# Patient Record
Sex: FEMALE | Race: WHITE
[De-identification: names, ages, dates, MRNs, and addresses within clinical notes are randomized per-mention and may not be internally consistent; named-entity substitution may affect disease eponyms.]

---

## 2021-02-02 ENCOUNTER — HOSPITAL ENCOUNTER (INPATIENT)
Dept: HOSPITAL 54 - ER | Age: 71
LOS: 10 days | Discharge: HOME HEALTH | DRG: 177 | End: 2021-02-12
Attending: FAMILY MEDICINE | Admitting: INTERNAL MEDICINE
Payer: MEDICARE

## 2021-02-02 VITALS — BODY MASS INDEX: 29.82 KG/M2 | WEIGHT: 179 LBS | HEIGHT: 65 IN

## 2021-02-02 DIAGNOSIS — N17.0: ICD-10-CM

## 2021-02-02 DIAGNOSIS — U07.1: Primary | ICD-10-CM

## 2021-02-02 DIAGNOSIS — M17.0: ICD-10-CM

## 2021-02-02 DIAGNOSIS — J96.01: ICD-10-CM

## 2021-02-02 DIAGNOSIS — I10: ICD-10-CM

## 2021-02-02 DIAGNOSIS — E88.09: ICD-10-CM

## 2021-02-02 DIAGNOSIS — Z79.899: ICD-10-CM

## 2021-02-02 DIAGNOSIS — J12.82: ICD-10-CM

## 2021-02-02 DIAGNOSIS — E66.9: ICD-10-CM

## 2021-02-02 DIAGNOSIS — Z96.653: ICD-10-CM

## 2021-02-02 DIAGNOSIS — E43: ICD-10-CM

## 2021-02-02 DIAGNOSIS — Z98.890: ICD-10-CM

## 2021-02-02 LAB
ALBUMIN SERPL BCP-MCNC: 2.4 G/DL (ref 3.4–5)
ALP SERPL-CCNC: 24 U/L (ref 46–116)
ALT SERPL W P-5'-P-CCNC: 73 U/L (ref 12–78)
AST SERPL W P-5'-P-CCNC: 55 U/L (ref 15–37)
BASE EXCESS BLDA CALC-SCNC: -4.4 MMOL/L
BASE EXCESS BLDA CALC-SCNC: -4.5 MMOL/L
BASOPHILS # BLD AUTO: 0 /CMM (ref 0–0.2)
BASOPHILS NFR BLD AUTO: 0.1 % (ref 0–2)
BILIRUB DIRECT SERPL-MCNC: 0.1 MG/DL (ref 0–0.2)
BILIRUB SERPL-MCNC: 0.5 MG/DL (ref 0.2–1)
BUN SERPL-MCNC: 18 MG/DL (ref 7–18)
CALCIUM SERPL-MCNC: 8.4 MG/DL (ref 8.5–10.1)
CHLORIDE SERPL-SCNC: 100 MMOL/L (ref 98–107)
CK SERPL-CCNC: 75 U/L (ref 26–192)
CO2 SERPL-SCNC: 22 MMOL/L (ref 21–32)
CREAT SERPL-MCNC: 1 MG/DL (ref 0.6–1.3)
CRP SERPL-MCNC: 40.1 MG/DL (ref 0–0.9)
D DIMER PPP FEU-MCNC: 1.61 MG/L(FEU (ref 0.17–0.5)
DO-HGB MFR BLDA: 210.3 MMHG
DO-HGB MFR BLDA: 617.7 MMHG
EOSINOPHIL NFR BLD AUTO: 0 % (ref 0–6)
FERRITIN SERPL-MCNC: 820 NG/ML (ref 8–388)
GLUCOSE SERPL-MCNC: 141 MG/DL (ref 74–106)
HCT VFR BLD AUTO: 41 % (ref 33–45)
HGB BLD-MCNC: 13.8 G/DL (ref 11.5–14.8)
INHALED O2 CONCENTRATION: 100 %
INHALED O2 CONCENTRATION: 42 %
INHALED O2 FLOW RATE: 5.5 L/MIN (ref 0–30)
LYMPHOCYTES NFR BLD AUTO: 0.8 /CMM (ref 0.8–4.8)
LYMPHOCYTES NFR BLD AUTO: 6.3 % (ref 20–44)
MCHC RBC AUTO-ENTMCNC: 34 G/DL (ref 31–36)
MCV RBC AUTO: 90 FL (ref 82–100)
MONOCYTES NFR BLD AUTO: 0.7 /CMM (ref 0.1–1.3)
MONOCYTES NFR BLD AUTO: 5.3 % (ref 2–12)
NEUTROPHILS # BLD AUTO: 11.6 /CMM (ref 1.8–8.9)
NEUTROPHILS NFR BLD AUTO: 88.3 % (ref 43–81)
PCO2 TEMP ADJ BLDA: 22.3 MMHG (ref 35–45)
PCO2 TEMP ADJ BLDA: 25.4 MMHG (ref 35–45)
PH TEMP ADJ BLDA: 7.46 [PH] (ref 7.35–7.45)
PH TEMP ADJ BLDA: 7.49 [PH] (ref 7.35–7.45)
PLATELET # BLD AUTO: 283 /CMM (ref 150–450)
PO2 TEMP ADJ BLDA: 63.6 MMHG (ref 75–100)
PO2 TEMP ADJ BLDA: 69.9 MMHG (ref 75–100)
POTASSIUM SERPL-SCNC: 4 MMOL/L (ref 3.5–5.1)
PROT SERPL-MCNC: 7.1 G/DL (ref 6.4–8.2)
RBC # BLD AUTO: 4.54 MIL/UL (ref 4–5.2)
SAO2 % BLDA: 92.6 % (ref 92–98.5)
SAO2 % BLDA: 93.8 % (ref 92–98.5)
SODIUM SERPL-SCNC: 135 MMOL/L (ref 136–145)
VENTILATION MODE VENT: (no result)
WBC NRBC COR # BLD AUTO: 13.2 K/UL (ref 4.3–11)

## 2021-02-02 PROCEDURE — U0003 INFECTIOUS AGENT DETECTION BY NUCLEIC ACID (DNA OR RNA); SEVERE ACUTE RESPIRATORY SYNDROME CORONAVIRUS 2 (SARS-COV-2) (CORONAVIRUS DISEASE [COVID-19]), AMPLIFIED PROBE TECHNIQUE, MAKING USE OF HIGH THROUGHPUT TECHNOLOGIES AS DESCRIBED BY CMS-2020-01-R: HCPCS

## 2021-02-02 PROCEDURE — A4217 STERILE WATER/SALINE, 500 ML: HCPCS

## 2021-02-02 PROCEDURE — G0378 HOSPITAL OBSERVATION PER HR: HCPCS

## 2021-02-02 PROCEDURE — A4216 STERILE WATER/SALINE, 10 ML: HCPCS

## 2021-02-02 PROCEDURE — XW033E5 INTRODUCTION OF REMDESIVIR ANTI-INFECTIVE INTO PERIPHERAL VEIN, PERCUTANEOUS APPROACH, NEW TECHNOLOGY GROUP 5: ICD-10-PCS | Performed by: INTERNAL MEDICINE

## 2021-02-02 PROCEDURE — C9803 HOPD COVID-19 SPEC COLLECT: HCPCS

## 2021-02-02 RX ADMIN — AMLODIPINE BESYLATE SCH MG: 10 TABLET ORAL at 20:01

## 2021-02-02 RX ADMIN — LOSARTAN POTASSIUM SCH MG: 50 TABLET, FILM COATED ORAL at 20:01

## 2021-02-02 NOTE — NUR
-------------------------------------------------------------------------------

          *** Note undone in [facility] - 02/02/21 at 1928 by SATISH ***           

-------------------------------------------------------------------------------

TOOK OVER PT CARE. PT REMAINS ON HIGH FLOW 40L/MIN 31C. WILL CONTINUE TO 
MONITOR.

## 2021-02-02 NOTE — NUR
PT BIBA RA81 From Home "Cough/SOB/fever/chills/weak Dx w/covid 2wks ago 88% on 
RA". PT IS AAOX4, NOTED RESPIRATORY DISTRESS, HOOKED TO O2 VIA NC AT 6LPM, 
HOOKED TO CARDIAC MONITOR, KEPT RESTED AND COMFORTABLE. WILL CONTINUE TO 
MONITOR.

## 2021-02-03 VITALS — SYSTOLIC BLOOD PRESSURE: 125 MMHG | DIASTOLIC BLOOD PRESSURE: 79 MMHG

## 2021-02-03 VITALS — DIASTOLIC BLOOD PRESSURE: 93 MMHG | SYSTOLIC BLOOD PRESSURE: 132 MMHG

## 2021-02-03 VITALS — DIASTOLIC BLOOD PRESSURE: 75 MMHG | SYSTOLIC BLOOD PRESSURE: 129 MMHG

## 2021-02-03 LAB
ALBUMIN SERPL BCP-MCNC: 2.2 G/DL (ref 3.4–5)
ALP SERPL-CCNC: 24 U/L (ref 46–116)
ALT SERPL W P-5'-P-CCNC: 92 U/L (ref 12–78)
AST SERPL W P-5'-P-CCNC: 53 U/L (ref 15–37)
BASOPHILS # BLD AUTO: 0 /CMM (ref 0–0.2)
BASOPHILS NFR BLD AUTO: 0.1 % (ref 0–2)
BILIRUB DIRECT SERPL-MCNC: 0.2 MG/DL (ref 0–0.2)
BILIRUB SERPL-MCNC: 0.4 MG/DL (ref 0.2–1)
BUN SERPL-MCNC: 18 MG/DL (ref 7–18)
CALCIUM SERPL-MCNC: 8.5 MG/DL (ref 8.5–10.1)
CHLORIDE SERPL-SCNC: 102 MMOL/L (ref 98–107)
CHOLEST SERPL-MCNC: 179 MG/DL (ref ?–200)
CK SERPL-CCNC: 62 U/L (ref 26–192)
CO2 SERPL-SCNC: 23 MMOL/L (ref 21–32)
CREAT SERPL-MCNC: 0.9 MG/DL (ref 0.6–1.3)
CRP SERPL-MCNC: 26.5 MG/DL (ref 0–0.9)
D DIMER PPP FEU-MCNC: 1.3 MG/L(FEU (ref 0.17–0.5)
EOSINOPHIL NFR BLD AUTO: 0 % (ref 0–6)
FERRITIN SERPL-MCNC: 824 NG/ML (ref 8–388)
GLUCOSE SERPL-MCNC: 146 MG/DL (ref 74–106)
HCT VFR BLD AUTO: 39 % (ref 33–45)
HDLC SERPL-MCNC: 14 MG/DL (ref 40–60)
HGB BLD-MCNC: 13.2 G/DL (ref 11.5–14.8)
LDLC SERPL DIRECT ASSAY-MCNC: 132 MG/DL (ref 0–99)
LYMPHOCYTES NFR BLD AUTO: 0.7 /CMM (ref 0.8–4.8)
LYMPHOCYTES NFR BLD AUTO: 8.4 % (ref 20–44)
MAGNESIUM SERPL-MCNC: 2.3 MG/DL (ref 1.8–2.4)
MCHC RBC AUTO-ENTMCNC: 34 G/DL (ref 31–36)
MCV RBC AUTO: 90 FL (ref 82–100)
MONOCYTES NFR BLD AUTO: 0.5 /CMM (ref 0.1–1.3)
MONOCYTES NFR BLD AUTO: 5.7 % (ref 2–12)
NEUTROPHILS # BLD AUTO: 7.7 /CMM (ref 1.8–8.9)
NEUTROPHILS NFR BLD AUTO: 85.8 % (ref 43–81)
NT-PROBNP SERPL-MCNC: 300 PG/ML (ref 0–125)
PHOSPHATE SERPL-MCNC: 4 MG/DL (ref 2.5–4.9)
PLATELET # BLD AUTO: 276 /CMM (ref 150–450)
POTASSIUM SERPL-SCNC: 4.6 MMOL/L (ref 3.5–5.1)
PROT SERPL-MCNC: 6.7 G/DL (ref 6.4–8.2)
RBC # BLD AUTO: 4.29 MIL/UL (ref 4–5.2)
SODIUM SERPL-SCNC: 136 MMOL/L (ref 136–145)
TRIGL SERPL-MCNC: 165 MG/DL (ref 30–150)
TSH SERPL DL<=0.005 MIU/L-ACNC: 0.51 UIU/ML (ref 0.36–3.74)
WBC NRBC COR # BLD AUTO: 8.9 K/UL (ref 4.3–11)

## 2021-02-03 RX ADMIN — LOSARTAN POTASSIUM SCH MG: 50 TABLET, FILM COATED ORAL at 09:00

## 2021-02-03 RX ADMIN — DEXAMETHASONE SODIUM PHOSPHATE SCH MG: 10 INJECTION INTRAMUSCULAR; INTRAVENOUS at 09:18

## 2021-02-03 RX ADMIN — AMLODIPINE BESYLATE SCH MG: 10 TABLET ORAL at 09:00

## 2021-02-03 RX ADMIN — ENOXAPARIN SODIUM SCH MG: 40 INJECTION SUBCUTANEOUS at 10:39

## 2021-02-03 RX ADMIN — SODIUM CHLORIDE SCH MLS/HR: 9 INJECTION, SOLUTION INTRAVENOUS at 19:57

## 2021-02-03 RX ADMIN — ENOXAPARIN SODIUM SCH MG: 40 INJECTION SUBCUTANEOUS at 17:10

## 2021-02-03 NOTE — NUR
PT IS COUGHING A LOT REPORTED IT TO BELKYS CONNOLLY NP WITH ORDER OF ROBITUSSIN 10CC [id number] 
NOTED AND CARRIED OUT

## 2021-02-03 NOTE — NUR
RECEIVED PATIENT IN BED, A/OX4 BREATHING EVEN AND UNLABORED,ABLE TO VERBALIZED NEEDS ON NON 
- REBREATHER MASK AND HIGH FLOW NC,  60L, 100%FIO2,   SPO2 IS 93%, , NORMAL SINUS RHYTHM ON 
TELE-MONITOR WITH HR 60-80S,  RIGHT AC IV LINES PATENT ,INTACT AND FLUSHED ,  SAFETY 
MEASURES IN PLACE, CALL LIGHT IN REACH, HOB ELEVATED,  WILL CONT TO MONITOR .

## 2021-02-03 NOTE — NUR
PT COMPLAINING OF HEADACHE DUE TO HIGH OXYGEN DELIVERY FROM THE HIGH FLOW INFORMED RT AND WE 
LOWER IT DOWN TO 35L FIO2 100% VIA HIGH FLOW SPO2 NOW IS 92% SHE VERBALIZED THAT ITS MORE 
COMFORTABLE NOW WILL CONT TO MONITOR THE PT

## 2021-02-03 NOTE — NUR
RN CLOSING N0TES



PT RESTING IN BED. HF 60L, FIO2 100% SATURATING @93%. WITH NON PRODUCTIVE COUGH. SKIN IS 
INTACT. IV ACCESS INTACT, PATENT AND FLUSHED. NO PAIN REPORTED AT TIME. NEEDS ATTENDED. NO 
SIGNIFICANT CHANGES. SAFETY MEAUSRES IN PLACE, BED LOCKED AND AT LOWEST POSITION. CALL LIGHT 
WITHIN REACH. HOB ELEVATED. BED ALARM ON. WILL ENDORSE TO NIGHT NURSE FOR JOSEP.

## 2021-02-03 NOTE — NUR
RN OPENING N0TES



RECEIVED PT FROM ER VIA [facility]. AWAKE, A/O X4. HF 60L, FIO2 100% SATURATING @93%. WITH NON 
PRODUCTIVE COUGH. SKIN IS INTACT. IV ACCESS ON R AC #18 INTACT, PATENT AND FLUSHED. REFUSED 
TO CHANGE INTO HOSPITAL GOWN. BELONGINGS LIST SIGNED. NO PAIN REPORTED AT TIME. PLACED TO 
BED, LOCKED AND AT LOWEST POSITION. CALL LIGHT WITHIN REACH. HOB ELEVATED. BED ALARM ON. 
WILL CONTINUE TO MONITOR.

## 2021-02-04 VITALS — DIASTOLIC BLOOD PRESSURE: 62 MMHG | SYSTOLIC BLOOD PRESSURE: 104 MMHG

## 2021-02-04 VITALS — DIASTOLIC BLOOD PRESSURE: 75 MMHG | SYSTOLIC BLOOD PRESSURE: 128 MMHG

## 2021-02-04 VITALS — SYSTOLIC BLOOD PRESSURE: 124 MMHG | DIASTOLIC BLOOD PRESSURE: 70 MMHG

## 2021-02-04 VITALS — DIASTOLIC BLOOD PRESSURE: 77 MMHG | SYSTOLIC BLOOD PRESSURE: 131 MMHG

## 2021-02-04 VITALS — SYSTOLIC BLOOD PRESSURE: 139 MMHG | DIASTOLIC BLOOD PRESSURE: 78 MMHG

## 2021-02-04 VITALS — DIASTOLIC BLOOD PRESSURE: 72 MMHG | SYSTOLIC BLOOD PRESSURE: 121 MMHG

## 2021-02-04 LAB
ALBUMIN SERPL BCP-MCNC: 2.2 G/DL (ref 3.4–5)
ALP SERPL-CCNC: 23 U/L (ref 46–116)
ALT SERPL W P-5'-P-CCNC: 101 U/L (ref 12–78)
AST SERPL W P-5'-P-CCNC: 45 U/L (ref 15–37)
BASOPHILS # BLD AUTO: 0 /CMM (ref 0–0.2)
BASOPHILS NFR BLD AUTO: 0 % (ref 0–2)
BILIRUB DIRECT SERPL-MCNC: 0.1 MG/DL (ref 0–0.2)
BILIRUB SERPL-MCNC: 0.4 MG/DL (ref 0.2–1)
BUN SERPL-MCNC: 27 MG/DL (ref 7–18)
CALCIUM SERPL-MCNC: 8.7 MG/DL (ref 8.5–10.1)
CHLORIDE SERPL-SCNC: 103 MMOL/L (ref 98–107)
CK SERPL-CCNC: 50 U/L (ref 26–192)
CO2 SERPL-SCNC: 26 MMOL/L (ref 21–32)
CREAT SERPL-MCNC: 0.9 MG/DL (ref 0.6–1.3)
EOSINOPHIL NFR BLD AUTO: 0 % (ref 0–6)
FERRITIN SERPL-MCNC: 927 NG/ML (ref 8–388)
GLUCOSE SERPL-MCNC: 107 MG/DL (ref 74–106)
HCT VFR BLD AUTO: 39 % (ref 33–45)
HGB BLD-MCNC: 13.4 G/DL (ref 11.5–14.8)
LYMPHOCYTES NFR BLD AUTO: 0.7 /CMM (ref 0.8–4.8)
LYMPHOCYTES NFR BLD AUTO: 7.1 % (ref 20–44)
MAGNESIUM SERPL-MCNC: 2.5 MG/DL (ref 1.8–2.4)
MCHC RBC AUTO-ENTMCNC: 35 G/DL (ref 31–36)
MCV RBC AUTO: 90 FL (ref 82–100)
MONOCYTES NFR BLD AUTO: 0.9 /CMM (ref 0.1–1.3)
MONOCYTES NFR BLD AUTO: 9 % (ref 2–12)
NEUTROPHILS # BLD AUTO: 8.5 /CMM (ref 1.8–8.9)
NEUTROPHILS NFR BLD AUTO: 83.9 % (ref 43–81)
PHOSPHATE SERPL-MCNC: 4 MG/DL (ref 2.5–4.9)
PLATELET # BLD AUTO: 320 /CMM (ref 150–450)
POTASSIUM SERPL-SCNC: 4.5 MMOL/L (ref 3.5–5.1)
PROT SERPL-MCNC: 6.7 G/DL (ref 6.4–8.2)
RBC # BLD AUTO: 4.27 MIL/UL (ref 4–5.2)
SODIUM SERPL-SCNC: 138 MMOL/L (ref 136–145)
WBC NRBC COR # BLD AUTO: 10.1 K/UL (ref 4.3–11)

## 2021-02-04 RX ADMIN — ENOXAPARIN SODIUM SCH MG: 40 INJECTION SUBCUTANEOUS at 10:40

## 2021-02-04 RX ADMIN — SODIUM CHLORIDE SCH MLS/HR: 9 INJECTION, SOLUTION INTRAVENOUS at 20:32

## 2021-02-04 RX ADMIN — DEXAMETHASONE SODIUM PHOSPHATE SCH MG: 10 INJECTION INTRAMUSCULAR; INTRAVENOUS at 10:10

## 2021-02-04 RX ADMIN — LOSARTAN POTASSIUM SCH MG: 50 TABLET, FILM COATED ORAL at 09:00

## 2021-02-04 RX ADMIN — GUAIFENESIN AND CODEINE PHOSPHATE PRN ML: 100; 10 SOLUTION ORAL at 03:34

## 2021-02-04 RX ADMIN — AMLODIPINE BESYLATE SCH MG: 10 TABLET ORAL at 09:00

## 2021-02-04 RX ADMIN — GUAIFENESIN AND CODEINE PHOSPHATE PRN ML: 100; 10 SOLUTION ORAL at 16:52

## 2021-02-04 RX ADMIN — ENOXAPARIN SODIUM SCH MG: 40 INJECTION SUBCUTANEOUS at 16:46

## 2021-02-04 NOTE — NUR
TELE/RN CLOSING NOTE



Patient watching TV in bed, A&O x 4, easily arousable to tactile and verbal stimulation. All 
needs met and attended to. No s/s of pain/discomfort at this time. Breathing even and 
non-labored on HFNC 35 L FIO2 100% + 15 L NRB, saturating 91-93%. No respiratory or cardiac 
distress noted. On tele monitor, reading SR 74. IV access noted on RAC #18, patent and 
intact, and flushing well. Fall precautions maintained. Will endorse to night shift nurse.

## 2021-02-04 NOTE — NUR
RN OPENING NOTE: 



PATIENT RECEIVED IN ROOM IN NO SIGNS OF RESPIRATORY DISTRESS. PATIENT IS ON O2 THERAPY VIA 
HFNC 35 % + NRB 15 LPM TOLERATING WELL. IV ACCESS MAINTAINED INTACT, SECURED AND 
FLUSHING WELL. ISOLATION PRECAUTIONS MAINTAINED. SAFETY MEASURES IMPLEMENTED, BED LOCKED IN 
LOWEST POSITION, SIDE RAILS UP, CALL LIGHT WITHIN REACH. WILL CONTINUE TO MONITOR PATIENT 
AND PROVIDE CARE THROUGHOUT SHIFT.

## 2021-02-04 NOTE — NUR
PT ON BED ASLEEP EASY TO WAKE UP A/O X3 STILL ON HIGH FLOW 35L 100% FIO2 AND NON REBREATHER 
MASK 15L WITH SPO2 97% NO DISTRESS NOTED NO PAIN COMPLAINED NO SIGNIFICANT CHANGES ON 
CONDITION NOTED ALL NEEDS ATTENDED BED ON LOWEST POSITION AND LOCKED SIDE RAILS UP X2 CALL 
LIGHT WITHIN REACH WILL ENDORSED TO AM SHIFT NURSE

## 2021-02-04 NOTE — NUR
TELE/RN NOTE



Received patient resting in bed, A&O x 4, easily arousable to tactile and verbal 
stimulation. No s/s of pain/discomfort at this time. Breathing even and non-labored on RA, 
no SOB noted. No respiratory or cardiac distress noted. On tele monitor, reading SR 85. IV 
access noted on RAC #18, patent and intact, and flushing well. Bed locked to its lowest 
position, side rails x 2 up, bed alarm on. Will continue with current medical management. 

-------------------------------------------------------------------------------

Addendum: 02/04/21 at 1846 by DAREK BATES RN

-------------------------------------------------------------------------------

CORRECTION: Breathing even and non-labored on HFNC 35 L FIO2 100% + 15 L NRB, saturating 
91%.

## 2021-02-05 VITALS — SYSTOLIC BLOOD PRESSURE: 106 MMHG | DIASTOLIC BLOOD PRESSURE: 55 MMHG

## 2021-02-05 VITALS — DIASTOLIC BLOOD PRESSURE: 55 MMHG | SYSTOLIC BLOOD PRESSURE: 118 MMHG

## 2021-02-05 VITALS — SYSTOLIC BLOOD PRESSURE: 133 MMHG | DIASTOLIC BLOOD PRESSURE: 70 MMHG

## 2021-02-05 VITALS — DIASTOLIC BLOOD PRESSURE: 80 MMHG | SYSTOLIC BLOOD PRESSURE: 138 MMHG

## 2021-02-05 VITALS — DIASTOLIC BLOOD PRESSURE: 58 MMHG | SYSTOLIC BLOOD PRESSURE: 105 MMHG

## 2021-02-05 VITALS — DIASTOLIC BLOOD PRESSURE: 69 MMHG | SYSTOLIC BLOOD PRESSURE: 121 MMHG

## 2021-02-05 LAB
ALBUMIN SERPL BCP-MCNC: 2.3 G/DL (ref 3.4–5)
ALP SERPL-CCNC: 25 U/L (ref 46–116)
ALT SERPL W P-5'-P-CCNC: 74 U/L (ref 12–78)
AST SERPL W P-5'-P-CCNC: 29 U/L (ref 15–37)
BASOPHILS # BLD AUTO: 0 /CMM (ref 0–0.2)
BASOPHILS NFR BLD AUTO: 0.1 % (ref 0–2)
BILIRUB DIRECT SERPL-MCNC: 0.1 MG/DL (ref 0–0.2)
BILIRUB SERPL-MCNC: 0.4 MG/DL (ref 0.2–1)
BUN SERPL-MCNC: 29 MG/DL (ref 7–18)
CALCIUM SERPL-MCNC: 8.6 MG/DL (ref 8.5–10.1)
CHLORIDE SERPL-SCNC: 105 MMOL/L (ref 98–107)
CO2 SERPL-SCNC: 24 MMOL/L (ref 21–32)
CREAT SERPL-MCNC: 1 MG/DL (ref 0.6–1.3)
CRP SERPL-MCNC: 9.8 MG/DL (ref 0–0.9)
EOSINOPHIL NFR BLD AUTO: 0.4 % (ref 0–6)
GLUCOSE SERPL-MCNC: 80 MG/DL (ref 74–106)
HCT VFR BLD AUTO: 40 % (ref 33–45)
HGB BLD-MCNC: 13.5 G/DL (ref 11.5–14.8)
LYMPHOCYTES NFR BLD AUTO: 0.9 /CMM (ref 0.8–4.8)
LYMPHOCYTES NFR BLD AUTO: 9.8 % (ref 20–44)
MCHC RBC AUTO-ENTMCNC: 34 G/DL (ref 31–36)
MCV RBC AUTO: 91 FL (ref 82–100)
MONOCYTES NFR BLD AUTO: 0.7 /CMM (ref 0.1–1.3)
MONOCYTES NFR BLD AUTO: 8.1 % (ref 2–12)
NEUTROPHILS # BLD AUTO: 7.3 /CMM (ref 1.8–8.9)
NEUTROPHILS NFR BLD AUTO: 81.6 % (ref 43–81)
PLATELET # BLD AUTO: 333 /CMM (ref 150–450)
POTASSIUM SERPL-SCNC: 4.4 MMOL/L (ref 3.5–5.1)
PROT SERPL-MCNC: 6.8 G/DL (ref 6.4–8.2)
RBC # BLD AUTO: 4.4 MIL/UL (ref 4–5.2)
SODIUM SERPL-SCNC: 139 MMOL/L (ref 136–145)
WBC NRBC COR # BLD AUTO: 9 K/UL (ref 4.3–11)

## 2021-02-05 RX ADMIN — ENOXAPARIN SODIUM SCH MG: 40 INJECTION SUBCUTANEOUS at 16:07

## 2021-02-05 RX ADMIN — LOSARTAN POTASSIUM SCH MG: 50 TABLET, FILM COATED ORAL at 08:26

## 2021-02-05 RX ADMIN — GUAIFENESIN AND CODEINE PHOSPHATE PRN ML: 100; 10 SOLUTION ORAL at 10:19

## 2021-02-05 RX ADMIN — DEXAMETHASONE SODIUM PHOSPHATE SCH MG: 10 INJECTION INTRAMUSCULAR; INTRAVENOUS at 08:26

## 2021-02-05 RX ADMIN — SODIUM CHLORIDE SCH MLS/HR: 9 INJECTION, SOLUTION INTRAVENOUS at 20:04

## 2021-02-05 RX ADMIN — ENOXAPARIN SODIUM SCH MG: 40 INJECTION SUBCUTANEOUS at 08:34

## 2021-02-05 RX ADMIN — AMLODIPINE BESYLATE SCH MG: 10 TABLET ORAL at 08:26

## 2021-02-05 NOTE — NUR
RN TELE NOTES

PT IN BED, RESTING, EASY TO AROUSE, ALERT AND ORIENTED, NO COMPLAINT AT THIS TIME, CALL 
LIGHT WITHIN REACH.

## 2021-02-05 NOTE — NUR
RN TELE NOTES

PT IN BED, AWAKE, ALERT AND ORIENTED, NO COMPLAINT OF PAIN OR ANY DISCOMFORT, NOT IN 
DISTRESS, CALL LIGHT WITHIN REACH, NEEDS ATTENDED.

## 2021-02-05 NOTE — NUR
RN TELE NOTES

PT IN BED, RESTING, NO COMPLAINT OF PAIN OR ANY DISCOMFORT, NO SOB NOTED, ON HIGH FLOW O2 
WITH NRB MASK, WITH O2 SAT OF 95%, PM MEDS GIVEN, OFFERED PM CARE BUT PT REFUSED, STATED 
THAT SHE IS OK FOR NOW, CALL LIGHT WITHIN EASY REACH.

## 2021-02-06 VITALS — SYSTOLIC BLOOD PRESSURE: 113 MMHG | DIASTOLIC BLOOD PRESSURE: 60 MMHG

## 2021-02-06 VITALS — DIASTOLIC BLOOD PRESSURE: 68 MMHG | SYSTOLIC BLOOD PRESSURE: 122 MMHG

## 2021-02-06 VITALS — DIASTOLIC BLOOD PRESSURE: 65 MMHG | SYSTOLIC BLOOD PRESSURE: 125 MMHG

## 2021-02-06 VITALS — SYSTOLIC BLOOD PRESSURE: 103 MMHG | DIASTOLIC BLOOD PRESSURE: 54 MMHG

## 2021-02-06 VITALS — DIASTOLIC BLOOD PRESSURE: 69 MMHG | SYSTOLIC BLOOD PRESSURE: 110 MMHG

## 2021-02-06 VITALS — DIASTOLIC BLOOD PRESSURE: 46 MMHG | SYSTOLIC BLOOD PRESSURE: 95 MMHG

## 2021-02-06 LAB
ALBUMIN SERPL BCP-MCNC: 2 G/DL (ref 3.4–5)
ALP SERPL-CCNC: 21 U/L (ref 46–116)
ALT SERPL W P-5'-P-CCNC: 67 U/L (ref 12–78)
AST SERPL W P-5'-P-CCNC: 31 U/L (ref 15–37)
BASOPHILS # BLD AUTO: 0 /CMM (ref 0–0.2)
BASOPHILS NFR BLD AUTO: 0.1 % (ref 0–2)
BILIRUB DIRECT SERPL-MCNC: 0.1 MG/DL (ref 0–0.2)
BILIRUB SERPL-MCNC: 0.4 MG/DL (ref 0.2–1)
BUN SERPL-MCNC: 25 MG/DL (ref 7–18)
CALCIUM SERPL-MCNC: 8.5 MG/DL (ref 8.5–10.1)
CHLORIDE SERPL-SCNC: 107 MMOL/L (ref 98–107)
CO2 SERPL-SCNC: 25 MMOL/L (ref 21–32)
CREAT SERPL-MCNC: 1.1 MG/DL (ref 0.6–1.3)
EOSINOPHIL NFR BLD AUTO: 1.1 % (ref 0–6)
GLUCOSE SERPL-MCNC: 73 MG/DL (ref 74–106)
HCT VFR BLD AUTO: 38 % (ref 33–45)
HGB BLD-MCNC: 12.9 G/DL (ref 11.5–14.8)
LYMPHOCYTES NFR BLD AUTO: 0.7 /CMM (ref 0.8–4.8)
LYMPHOCYTES NFR BLD AUTO: 10.1 % (ref 20–44)
MCHC RBC AUTO-ENTMCNC: 34 G/DL (ref 31–36)
MCV RBC AUTO: 91 FL (ref 82–100)
MONOCYTES NFR BLD AUTO: 0.6 /CMM (ref 0.1–1.3)
MONOCYTES NFR BLD AUTO: 8.1 % (ref 2–12)
NEUTROPHILS # BLD AUTO: 5.7 /CMM (ref 1.8–8.9)
NEUTROPHILS NFR BLD AUTO: 80.6 % (ref 43–81)
PLATELET # BLD AUTO: 312 /CMM (ref 150–450)
POTASSIUM SERPL-SCNC: 4.7 MMOL/L (ref 3.5–5.1)
PROT SERPL-MCNC: 6.2 G/DL (ref 6.4–8.2)
RBC # BLD AUTO: 4.17 MIL/UL (ref 4–5.2)
SODIUM SERPL-SCNC: 141 MMOL/L (ref 136–145)
WBC NRBC COR # BLD AUTO: 7 K/UL (ref 4.3–11)

## 2021-02-06 RX ADMIN — ENOXAPARIN SODIUM SCH MG: 40 INJECTION SUBCUTANEOUS at 17:34

## 2021-02-06 RX ADMIN — GUAIFENESIN AND CODEINE PHOSPHATE PRN ML: 100; 10 SOLUTION ORAL at 14:24

## 2021-02-06 RX ADMIN — SODIUM CHLORIDE SCH MLS/HR: 9 INJECTION, SOLUTION INTRAVENOUS at 20:13

## 2021-02-06 RX ADMIN — AMLODIPINE BESYLATE SCH MG: 10 TABLET ORAL at 08:01

## 2021-02-06 RX ADMIN — GUAIFENESIN AND CODEINE PHOSPHATE PRN ML: 100; 10 SOLUTION ORAL at 20:18

## 2021-02-06 RX ADMIN — LOSARTAN POTASSIUM SCH MG: 50 TABLET, FILM COATED ORAL at 08:01

## 2021-02-06 RX ADMIN — ENOXAPARIN SODIUM SCH MG: 40 INJECTION SUBCUTANEOUS at 08:02

## 2021-02-06 RX ADMIN — DEXAMETHASONE SODIUM PHOSPHATE SCH MG: 10 INJECTION INTRAMUSCULAR; INTRAVENOUS at 08:00

## 2021-02-06 NOTE — NUR
PT REMAINS IN BED ON HF 30L/75%. PT AOX4. NO NRB MASK. PT O2 SAT 94% NO SIGNS OF RESPIRATORY 
DISTRESS. PT THIS SHIFT CONSUMED APPROX LESS THAN 50% BREAKFAST, 10% LUNCH/DINNER. MD SERVIN ORDERS ENSURE WITH MEALS PER PT REQUEST. PT HAS RAC #18 IV HL. PT HAD ONE DOSE 
ROBITUSSIN FOR COUGH AT 1430. PT HAD 2X VOIDS ON BEDPAN, NO BM THIS SHIFT. ALL SAFETY 
MEASURES IN [place]. ALL NEEDS TO BE ENDORSED TO ONCOMING RN

## 2021-02-06 NOTE — NUR
RN NOTE



RECEIVED PT IN BED A/A/O X3. PT IS ON HIGH FLOW 30L, 75% FIO2. NO SOB NOTED, PT IS ON TELE 
MONITOR SHOWING SR IN 60s. SAFETY MEASURES IN PLACE.

## 2021-02-06 NOTE — NUR
PT RECEIVED IN BED, SLEEPING BUT EASILY AROUSABLE. O2 SAT 95% NO SOB ON 35L/100% HF + 15L 
NRB. PT IS ALERT AND ORIENTEDX4. PT SKIN INTACT, BEDREST. PT RAC #18 HL. ALL SAFETY MEASURES 
IN PLACE. WILL CONTINUE TO MONITOR

## 2021-02-06 NOTE — NUR
15L NRB REMOVED BY RT. PT SAT 86-89%. RT AWARE AND ADJUST HF TO 35L/90% WITH NO MASK. O2 SAT 
NOW 94-05%.

## 2021-02-07 VITALS — DIASTOLIC BLOOD PRESSURE: 63 MMHG | SYSTOLIC BLOOD PRESSURE: 129 MMHG

## 2021-02-07 VITALS — SYSTOLIC BLOOD PRESSURE: 113 MMHG | DIASTOLIC BLOOD PRESSURE: 73 MMHG

## 2021-02-07 VITALS — SYSTOLIC BLOOD PRESSURE: 125 MMHG | DIASTOLIC BLOOD PRESSURE: 72 MMHG

## 2021-02-07 VITALS — SYSTOLIC BLOOD PRESSURE: 93 MMHG | DIASTOLIC BLOOD PRESSURE: 54 MMHG

## 2021-02-07 VITALS — DIASTOLIC BLOOD PRESSURE: 55 MMHG | SYSTOLIC BLOOD PRESSURE: 110 MMHG

## 2021-02-07 VITALS — SYSTOLIC BLOOD PRESSURE: 107 MMHG | DIASTOLIC BLOOD PRESSURE: 59 MMHG

## 2021-02-07 VITALS — DIASTOLIC BLOOD PRESSURE: 72 MMHG | SYSTOLIC BLOOD PRESSURE: 125 MMHG

## 2021-02-07 VITALS — DIASTOLIC BLOOD PRESSURE: 54 MMHG | SYSTOLIC BLOOD PRESSURE: 93 MMHG

## 2021-02-07 LAB
ALBUMIN SERPL BCP-MCNC: 1.9 G/DL (ref 3.4–5)
ALP SERPL-CCNC: 19 U/L (ref 46–116)
ALT SERPL W P-5'-P-CCNC: 53 U/L (ref 12–78)
AST SERPL W P-5'-P-CCNC: 26 U/L (ref 15–37)
BASOPHILS # BLD AUTO: 0 /CMM (ref 0–0.2)
BASOPHILS NFR BLD AUTO: 0.4 % (ref 0–2)
BILIRUB DIRECT SERPL-MCNC: 0.1 MG/DL (ref 0–0.2)
BILIRUB SERPL-MCNC: 0.4 MG/DL (ref 0.2–1)
BUN SERPL-MCNC: 26 MG/DL (ref 7–18)
CALCIUM SERPL-MCNC: 8.3 MG/DL (ref 8.5–10.1)
CHLORIDE SERPL-SCNC: 105 MMOL/L (ref 98–107)
CO2 SERPL-SCNC: 23 MMOL/L (ref 21–32)
CREAT SERPL-MCNC: 0.9 MG/DL (ref 0.6–1.3)
EOSINOPHIL NFR BLD AUTO: 0.8 % (ref 0–6)
GLUCOSE SERPL-MCNC: 92 MG/DL (ref 74–106)
HCT VFR BLD AUTO: 36 % (ref 33–45)
HGB BLD-MCNC: 12.2 G/DL (ref 11.5–14.8)
LYMPHOCYTES NFR BLD AUTO: 0.9 /CMM (ref 0.8–4.8)
LYMPHOCYTES NFR BLD AUTO: 10.3 % (ref 20–44)
MCHC RBC AUTO-ENTMCNC: 34 G/DL (ref 31–36)
MCV RBC AUTO: 90 FL (ref 82–100)
MONOCYTES NFR BLD AUTO: 0.5 /CMM (ref 0.1–1.3)
MONOCYTES NFR BLD AUTO: 6.4 % (ref 2–12)
NEUTROPHILS # BLD AUTO: 6.8 /CMM (ref 1.8–8.9)
NEUTROPHILS NFR BLD AUTO: 82.1 % (ref 43–81)
PLATELET # BLD AUTO: 302 /CMM (ref 150–450)
POTASSIUM SERPL-SCNC: 4.7 MMOL/L (ref 3.5–5.1)
PROT SERPL-MCNC: 6 G/DL (ref 6.4–8.2)
RBC # BLD AUTO: 3.96 MIL/UL (ref 4–5.2)
SODIUM SERPL-SCNC: 137 MMOL/L (ref 136–145)
WBC NRBC COR # BLD AUTO: 8.3 K/UL (ref 4.3–11)

## 2021-02-07 RX ADMIN — Medication SCH ML: at 12:10

## 2021-02-07 RX ADMIN — Medication SCH ML: at 17:18

## 2021-02-07 RX ADMIN — AMLODIPINE BESYLATE SCH MG: 10 TABLET ORAL at 08:39

## 2021-02-07 RX ADMIN — DEXAMETHASONE SODIUM PHOSPHATE SCH MG: 10 INJECTION INTRAMUSCULAR; INTRAVENOUS at 08:29

## 2021-02-07 RX ADMIN — LOSARTAN POTASSIUM SCH MG: 50 TABLET, FILM COATED ORAL at 08:38

## 2021-02-07 RX ADMIN — ENOXAPARIN SODIUM SCH MG: 40 INJECTION SUBCUTANEOUS at 16:14

## 2021-02-07 RX ADMIN — ENOXAPARIN SODIUM SCH MG: 40 INJECTION SUBCUTANEOUS at 08:31

## 2021-02-07 RX ADMIN — Medication SCH ML: at 09:39

## 2021-02-07 NOTE — NUR
TELE RN NOTES



PATIENT RECEIVED FROM RN, PATIENT IN BED AT THIS TIME, ALERT AND ORIENTED X 4, [place] 
SPEAKING. ON HIGH FLOW NASAL CANNULA. ON CARDIAC MONITOR. IV ACCESS INTACT AND PATENT, 
SALINE FLUSH. PATIENT DENIES ANY PAIN OR DISCOMFORT AT THIS TIME. SAFETY PRECAUTIONS 
IMPLEMENTED WITH BED LOCKED, BILATERAL SIDE RAILS UP, BED ALARM ON, BED IN THE LOWEST 
POSITION AND CALL LIGHT WITHIN EASY REACH. WILL CONTINUE TO MONITOR.

## 2021-02-07 NOTE — NUR
TELE RN NOTES



INFORMED DR. WHITE, HOSPITALIST ABOUT PATIENTS BLOOD PRESSURE BEING ON THE LOWER SIDE AND 
INFORMED HELD BLOOD PRESSURE MEDS WHICH WERE SCHEDULE THIS MORNING. NO NEW ORDERS MADE AT 
THIS TIME. WILL CONTINUE TO MONITOR PATIENT.

## 2021-02-07 NOTE — NUR
TELE RN NOTES



PATIENT IN BED AT THIS TIME RESTING COMFORTABLY, ALERT AND ORIENTED X 4, Estonian SPEAKING. 
ON HIGH FLOW NASAL CANNULA 30 LITERS. ON CARDIAC MONITOR. IV ACCESS INTACT AND PATENT, 
SALINE FLUSH. SKIN KEPT CLEAN AND DRY. MET ALL OF PATIENT'S NEEDS. PATIENT DENIES ANY PAIN 
OR DISCOMFORT AT THIS TIME. SAFETY PRECAUTIONS IMPLEMENTED WITH BED LOCKED, BILATERAL SIDE 
RAILS UP, BED ALARM ON, BED IN THE LOWEST POSITION AND CALL LIGHT WITHIN EASY REACH. WILL 
ENDORSE PLAN OF CARE TO UPCOMING RN.

## 2021-02-07 NOTE — NUR
RN NOTE



RECEIVED PT IN BED A/A/O X3. ON HIGH FLOW 30 L WITH FIO2 65%,SATING 95%. PT ON TELE MONITOR 
SHOWING SR WITH HR IN 50s. SAFETY MEASURES IN PLACE.

## 2021-02-08 VITALS — DIASTOLIC BLOOD PRESSURE: 61 MMHG | SYSTOLIC BLOOD PRESSURE: 113 MMHG

## 2021-02-08 VITALS — DIASTOLIC BLOOD PRESSURE: 51 MMHG | SYSTOLIC BLOOD PRESSURE: 99 MMHG

## 2021-02-08 VITALS — SYSTOLIC BLOOD PRESSURE: 138 MMHG | DIASTOLIC BLOOD PRESSURE: 79 MMHG

## 2021-02-08 VITALS — DIASTOLIC BLOOD PRESSURE: 56 MMHG | SYSTOLIC BLOOD PRESSURE: 103 MMHG

## 2021-02-08 VITALS — SYSTOLIC BLOOD PRESSURE: 124 MMHG | DIASTOLIC BLOOD PRESSURE: 76 MMHG

## 2021-02-08 VITALS — DIASTOLIC BLOOD PRESSURE: 79 MMHG | SYSTOLIC BLOOD PRESSURE: 138 MMHG

## 2021-02-08 VITALS — DIASTOLIC BLOOD PRESSURE: 64 MMHG | SYSTOLIC BLOOD PRESSURE: 105 MMHG

## 2021-02-08 LAB
BASOPHILS # BLD AUTO: 0 /CMM (ref 0–0.2)
BASOPHILS NFR BLD AUTO: 0.3 % (ref 0–2)
BUN SERPL-MCNC: 26 MG/DL (ref 7–18)
CALCIUM SERPL-MCNC: 8.2 MG/DL (ref 8.5–10.1)
CHLORIDE SERPL-SCNC: 104 MMOL/L (ref 98–107)
CK SERPL-CCNC: 28 U/L (ref 26–192)
CO2 SERPL-SCNC: 26 MMOL/L (ref 21–32)
CREAT SERPL-MCNC: 0.9 MG/DL (ref 0.6–1.3)
CRP SERPL-MCNC: 8.3 MG/DL (ref 0–0.9)
EOSINOPHIL NFR BLD AUTO: 0.8 % (ref 0–6)
FERRITIN SERPL-MCNC: 506 NG/ML (ref 8–388)
GLUCOSE SERPL-MCNC: 84 MG/DL (ref 74–106)
HCT VFR BLD AUTO: 38 % (ref 33–45)
HGB BLD-MCNC: 12.9 G/DL (ref 11.5–14.8)
LYMPHOCYTES NFR BLD AUTO: 0.9 /CMM (ref 0.8–4.8)
LYMPHOCYTES NFR BLD AUTO: 11.6 % (ref 20–44)
MAGNESIUM SERPL-MCNC: 2.2 MG/DL (ref 1.8–2.4)
MCHC RBC AUTO-ENTMCNC: 34 G/DL (ref 31–36)
MCV RBC AUTO: 91 FL (ref 82–100)
MONOCYTES NFR BLD AUTO: 0.6 /CMM (ref 0.1–1.3)
MONOCYTES NFR BLD AUTO: 7.7 % (ref 2–12)
NEUTROPHILS # BLD AUTO: 5.9 /CMM (ref 1.8–8.9)
NEUTROPHILS NFR BLD AUTO: 79.6 % (ref 43–81)
PHOSPHATE SERPL-MCNC: 2.9 MG/DL (ref 2.5–4.9)
PLATELET # BLD AUTO: 330 /CMM (ref 150–450)
POTASSIUM SERPL-SCNC: 4.5 MMOL/L (ref 3.5–5.1)
RBC # BLD AUTO: 4.22 MIL/UL (ref 4–5.2)
SODIUM SERPL-SCNC: 137 MMOL/L (ref 136–145)
WBC NRBC COR # BLD AUTO: 7.4 K/UL (ref 4.3–11)

## 2021-02-08 RX ADMIN — AMLODIPINE BESYLATE SCH MG: 10 TABLET ORAL at 08:56

## 2021-02-08 RX ADMIN — ENOXAPARIN SODIUM SCH MG: 40 INJECTION SUBCUTANEOUS at 08:57

## 2021-02-08 RX ADMIN — ENOXAPARIN SODIUM SCH MG: 40 INJECTION SUBCUTANEOUS at 17:39

## 2021-02-08 RX ADMIN — Medication SCH ML: at 08:59

## 2021-02-08 RX ADMIN — Medication SCH ML: at 12:47

## 2021-02-08 RX ADMIN — DEXAMETHASONE SODIUM PHOSPHATE SCH MG: 10 INJECTION INTRAMUSCULAR; INTRAVENOUS at 08:54

## 2021-02-08 RX ADMIN — LOSARTAN POTASSIUM SCH MG: 50 TABLET, FILM COATED ORAL at 08:55

## 2021-02-08 RX ADMIN — Medication SCH ML: at 17:59

## 2021-02-08 NOTE — NUR
RN OPENING NOTE



RECEIVED PATIENT IN BED RESTING ALERT ORIENTED X4 VERBALLY RESPONSIVE ABLE TO MAKE NEEDS 
KNOWN,ON MONITOR FOR COVID POSITIVE DROPLET/CONTACT ISOLATION,ON HIGH FLOW OXYGEN 30L 
FIO2:60% O2:92-93% IV SITE IS ON RIGHT AC INTACT PATENT,CONTINENT BOWEL/BLADDER CALL LIGHT 
WITHIN REACH,SAFETY MEASURE IMPLEMENT,CONTINUE TO MONITOR.

## 2021-02-08 NOTE — NUR
MS/RN  OPENING NOTE



PATIENT A/O X4. PATIENT AWAKE IN BED. NO ACUTE DISTRESS NOTED. ALL NEEDS THROUGHOUT THE 
SHIFT. ALL SAFETY MEASURES IN PLACE WILL ENDORSE TO NIGHT SHIFT NURSE.

## 2021-02-08 NOTE — NUR
MS/RN  OPENING NOTE



RECEIVED PATIENT FROM NIGHT SHIFT NURSE

PATIENT A/O X4. PATIENT IN BED AWAKE. NO ACUTE DISTRESS NOTED. ALL SAFETY MEASURES IN PLACE 
WILL CONTINUE TO MONITOR AND ENSURE SAFETY.

## 2021-02-09 VITALS — DIASTOLIC BLOOD PRESSURE: 67 MMHG | SYSTOLIC BLOOD PRESSURE: 117 MMHG

## 2021-02-09 VITALS — SYSTOLIC BLOOD PRESSURE: 133 MMHG | DIASTOLIC BLOOD PRESSURE: 68 MMHG

## 2021-02-09 VITALS — SYSTOLIC BLOOD PRESSURE: 102 MMHG | DIASTOLIC BLOOD PRESSURE: 60 MMHG

## 2021-02-09 VITALS — SYSTOLIC BLOOD PRESSURE: 114 MMHG | DIASTOLIC BLOOD PRESSURE: 56 MMHG

## 2021-02-09 VITALS — DIASTOLIC BLOOD PRESSURE: 71 MMHG | SYSTOLIC BLOOD PRESSURE: 129 MMHG

## 2021-02-09 VITALS — SYSTOLIC BLOOD PRESSURE: 111 MMHG | DIASTOLIC BLOOD PRESSURE: 66 MMHG

## 2021-02-09 LAB
BASOPHILS # BLD AUTO: 0 /CMM (ref 0–0.2)
BASOPHILS NFR BLD AUTO: 0.1 % (ref 0–2)
BUN SERPL-MCNC: 24 MG/DL (ref 7–18)
CALCIUM SERPL-MCNC: 8.4 MG/DL (ref 8.5–10.1)
CHLORIDE SERPL-SCNC: 105 MMOL/L (ref 98–107)
CK SERPL-CCNC: 34 U/L (ref 26–192)
CO2 SERPL-SCNC: 26 MMOL/L (ref 21–32)
CREAT SERPL-MCNC: 1 MG/DL (ref 0.6–1.3)
EOSINOPHIL NFR BLD AUTO: 0.7 % (ref 0–6)
FERRITIN SERPL-MCNC: 427 NG/ML (ref 8–388)
GLUCOSE SERPL-MCNC: 95 MG/DL (ref 74–106)
HCT VFR BLD AUTO: 37 % (ref 33–45)
HGB BLD-MCNC: 12.5 G/DL (ref 11.5–14.8)
LYMPHOCYTES NFR BLD AUTO: 1 /CMM (ref 0.8–4.8)
LYMPHOCYTES NFR BLD AUTO: 14.2 % (ref 20–44)
MAGNESIUM SERPL-MCNC: 2.2 MG/DL (ref 1.8–2.4)
MCHC RBC AUTO-ENTMCNC: 34 G/DL (ref 31–36)
MCV RBC AUTO: 90 FL (ref 82–100)
MONOCYTES NFR BLD AUTO: 0.6 /CMM (ref 0.1–1.3)
MONOCYTES NFR BLD AUTO: 9.2 % (ref 2–12)
NEUTROPHILS # BLD AUTO: 5.2 /CMM (ref 1.8–8.9)
NEUTROPHILS NFR BLD AUTO: 75.8 % (ref 43–81)
PHOSPHATE SERPL-MCNC: 3.5 MG/DL (ref 2.5–4.9)
PLATELET # BLD AUTO: 329 /CMM (ref 150–450)
POTASSIUM SERPL-SCNC: 5 MMOL/L (ref 3.5–5.1)
RBC # BLD AUTO: 4.08 MIL/UL (ref 4–5.2)
SODIUM SERPL-SCNC: 138 MMOL/L (ref 136–145)
WBC NRBC COR # BLD AUTO: 6.9 K/UL (ref 4.3–11)

## 2021-02-09 RX ADMIN — DEXAMETHASONE SODIUM PHOSPHATE SCH MG: 10 INJECTION INTRAMUSCULAR; INTRAVENOUS at 08:47

## 2021-02-09 RX ADMIN — ENOXAPARIN SODIUM SCH MG: 40 INJECTION SUBCUTANEOUS at 16:06

## 2021-02-09 RX ADMIN — Medication SCH ML: at 07:59

## 2021-02-09 RX ADMIN — Medication SCH ML: at 17:04

## 2021-02-09 RX ADMIN — Medication SCH ML: at 12:00

## 2021-02-09 RX ADMIN — AMLODIPINE BESYLATE SCH MG: 10 TABLET ORAL at 08:47

## 2021-02-09 RX ADMIN — ENOXAPARIN SODIUM SCH MG: 40 INJECTION SUBCUTANEOUS at 08:48

## 2021-02-09 RX ADMIN — LOSARTAN POTASSIUM SCH MG: 50 TABLET, FILM COATED ORAL at 08:47

## 2021-02-09 NOTE — NUR
RN CLOSING NOTE



PATIENT REMAINS ON ALERT ORIENTED X4 VERBALLY RESPONSIVE ON HIGH FLOW OXYGEN 30L FIO2:60% 
O2:97% NO SOB NOT ACUTE DISTRESS NOTED IV SITE IS ON RIGHT AC INTACT PATENT,KEPT CLEAN AND 
DRY ALL THE TIME,KEPT COMFORTABLE,KEPT CALL LIGHT WITHIN REACH,ENDORSE NEXT COMING SHIFT FOR 
CONTINUATION OF CARE.

## 2021-02-09 NOTE — NUR
RN OPENING NOTE



RECEIVED PATIENT IN BED RESTING ALERT ORIENTED X4 VERBALLY RESPONSIVE ABLE TO MAKE NEEDS 
KNOWN,ON MONITOR FOR COVID POSITIVE DROPLET/CONTACT ISOLATION,ON HIGH FLOW OXYGEN 30L 
FIO2:50% O2:94-95% IV SITE IS ON RIGHT AC INTACT PATENT,CONTINENT BOWEL/BLADDER CALL LIGHT 
WITHIN REACH,SAFETY MEASURE IMPLEMENT,BED IN LOW POSITION AND LOCKED,CONTINUE TO MONITOR.

## 2021-02-09 NOTE — NUR
MS/RN   CLOSING NOTE



PATIENT IS AWAKE IN BED A/O X4. DENIES ANY PAIN AT THIS TIME. NO ACUTE DISTRESS NOTED. HIFLO 
OXYGEN 30L FIO2 50% VIA NASAL CANNULA TOLERATING WELL, NO SOB NOTED. ALL NEEDS MET 
THROUGHOUT THE SHIFT. SAFETY MEASURES IN PLACE BED LOCKED AND IN LOWEST POSITION, CALL LIGHT 
WITHIN REACH. WILL ENDORSE TO NIGHT SHIFT NURSE.

## 2021-02-10 VITALS — SYSTOLIC BLOOD PRESSURE: 94 MMHG | DIASTOLIC BLOOD PRESSURE: 38 MMHG

## 2021-02-10 VITALS — SYSTOLIC BLOOD PRESSURE: 139 MMHG | DIASTOLIC BLOOD PRESSURE: 65 MMHG

## 2021-02-10 VITALS — DIASTOLIC BLOOD PRESSURE: 63 MMHG | SYSTOLIC BLOOD PRESSURE: 106 MMHG

## 2021-02-10 VITALS — DIASTOLIC BLOOD PRESSURE: 60 MMHG | SYSTOLIC BLOOD PRESSURE: 115 MMHG

## 2021-02-10 VITALS — SYSTOLIC BLOOD PRESSURE: 136 MMHG | DIASTOLIC BLOOD PRESSURE: 61 MMHG

## 2021-02-10 LAB
BASOPHILS # BLD AUTO: 0 /CMM (ref 0–0.2)
BASOPHILS NFR BLD AUTO: 0.3 % (ref 0–2)
BUN SERPL-MCNC: 27 MG/DL (ref 7–18)
CALCIUM SERPL-MCNC: 8.6 MG/DL (ref 8.5–10.1)
CHLORIDE SERPL-SCNC: 105 MMOL/L (ref 98–107)
CO2 SERPL-SCNC: 28 MMOL/L (ref 21–32)
CREAT SERPL-MCNC: 1 MG/DL (ref 0.6–1.3)
EOSINOPHIL NFR BLD AUTO: 0.8 % (ref 0–6)
GLUCOSE SERPL-MCNC: 87 MG/DL (ref 74–106)
HCT VFR BLD AUTO: 40 % (ref 33–45)
HGB BLD-MCNC: 13.4 G/DL (ref 11.5–14.8)
LYMPHOCYTES NFR BLD AUTO: 1.2 /CMM (ref 0.8–4.8)
LYMPHOCYTES NFR BLD AUTO: 15.2 % (ref 20–44)
MCHC RBC AUTO-ENTMCNC: 33 G/DL (ref 31–36)
MCV RBC AUTO: 91 FL (ref 82–100)
MONOCYTES NFR BLD AUTO: 0.9 /CMM (ref 0.1–1.3)
MONOCYTES NFR BLD AUTO: 11.5 % (ref 2–12)
NEUTROPHILS # BLD AUTO: 5.6 /CMM (ref 1.8–8.9)
NEUTROPHILS NFR BLD AUTO: 72.2 % (ref 43–81)
PLATELET # BLD AUTO: 339 /CMM (ref 150–450)
POTASSIUM SERPL-SCNC: 5.1 MMOL/L (ref 3.5–5.1)
RBC # BLD AUTO: 4.42 MIL/UL (ref 4–5.2)
SODIUM SERPL-SCNC: 139 MMOL/L (ref 136–145)
WBC NRBC COR # BLD AUTO: 7.8 K/UL (ref 4.3–11)

## 2021-02-10 RX ADMIN — ENOXAPARIN SODIUM SCH MG: 40 INJECTION SUBCUTANEOUS at 18:13

## 2021-02-10 RX ADMIN — DEXAMETHASONE SODIUM PHOSPHATE SCH MG: 10 INJECTION INTRAMUSCULAR; INTRAVENOUS at 09:29

## 2021-02-10 RX ADMIN — Medication SCH ML: at 12:44

## 2021-02-10 RX ADMIN — LOSARTAN POTASSIUM SCH MG: 50 TABLET, FILM COATED ORAL at 09:00

## 2021-02-10 RX ADMIN — ENOXAPARIN SODIUM SCH MG: 40 INJECTION SUBCUTANEOUS at 09:31

## 2021-02-10 RX ADMIN — AMLODIPINE BESYLATE SCH MG: 10 TABLET ORAL at 09:00

## 2021-02-10 RX ADMIN — Medication SCH ML: at 09:39

## 2021-02-10 RX ADMIN — Medication SCH ML: at 18:13

## 2021-02-10 NOTE — NUR
RN CLOSING NOTE



PATIENT REMAINS ON ALERT ORIENTED X4 VERBALLY RESPONSIVE NO SOB NOT ACUTE DISTRESS NOTED ON 
HIGH FLOW OXYGEN,30 L FIO2 50% O2:96%,IV SITE IS ON RIGHT AC INTACT PATENT,SAFETY MEASURE 
IMPLEMENTED,KEPT CLEAN AND DRY ALL THE  TIME,KEPT COMFORTABLE,KEPT CALL LIGHT WITHIN 
REACH,ENDORSE NEXT COMING SHIFT FOR CONTINUATION OF CARE.

## 2021-02-10 NOTE — NUR
RECEIVED PATIENT IN BED, A/OX4 BREATHING EVEN AND UNLABORED,ABLE TO VERBALIZED NEEDS ON  
HIGH FLOW NC,  30L, 40%FIO2,   SPO2 IS 93%, , NORMAL SINUS RHYTHM ON TELE-MONITOR WITH HR 
60-80S,  RIGHT AC IV LINES PATENT ,INTACT AND FLUSHED ,  SAFETY MEASURES IN PLACE, CALL 
LIGHT IN REACH, HOB ELEVATED,  WILL CONT TO MONITOR .

## 2021-02-10 NOTE — NUR
ms rn

received on bed, awake,alert,oriented x4,not in any form of distres, patient on high flow 
o2, saturating well. denies pain at this time, will monitor patient.

## 2021-02-11 VITALS — DIASTOLIC BLOOD PRESSURE: 70 MMHG | SYSTOLIC BLOOD PRESSURE: 106 MMHG

## 2021-02-11 VITALS — SYSTOLIC BLOOD PRESSURE: 105 MMHG | DIASTOLIC BLOOD PRESSURE: 61 MMHG

## 2021-02-11 VITALS — DIASTOLIC BLOOD PRESSURE: 67 MMHG | SYSTOLIC BLOOD PRESSURE: 117 MMHG

## 2021-02-11 VITALS — DIASTOLIC BLOOD PRESSURE: 69 MMHG | SYSTOLIC BLOOD PRESSURE: 132 MMHG

## 2021-02-11 VITALS — SYSTOLIC BLOOD PRESSURE: 103 MMHG | DIASTOLIC BLOOD PRESSURE: 67 MMHG

## 2021-02-11 VITALS — DIASTOLIC BLOOD PRESSURE: 76 MMHG | SYSTOLIC BLOOD PRESSURE: 141 MMHG

## 2021-02-11 LAB
BASOPHILS # BLD AUTO: 0 /CMM (ref 0–0.2)
BASOPHILS NFR BLD AUTO: 0.2 % (ref 0–2)
BUN SERPL-MCNC: 26 MG/DL (ref 7–18)
CALCIUM SERPL-MCNC: 8.9 MG/DL (ref 8.5–10.1)
CHLORIDE SERPL-SCNC: 104 MMOL/L (ref 98–107)
CO2 SERPL-SCNC: 29 MMOL/L (ref 21–32)
CREAT SERPL-MCNC: 0.9 MG/DL (ref 0.6–1.3)
EOSINOPHIL NFR BLD AUTO: 0.5 % (ref 0–6)
GLUCOSE SERPL-MCNC: 96 MG/DL (ref 74–106)
HCT VFR BLD AUTO: 38 % (ref 33–45)
HGB BLD-MCNC: 12.7 G/DL (ref 11.5–14.8)
LYMPHOCYTES NFR BLD AUTO: 1.2 /CMM (ref 0.8–4.8)
LYMPHOCYTES NFR BLD AUTO: 13.9 % (ref 20–44)
MCHC RBC AUTO-ENTMCNC: 34 G/DL (ref 31–36)
MCV RBC AUTO: 90 FL (ref 82–100)
MONOCYTES NFR BLD AUTO: 0.9 /CMM (ref 0.1–1.3)
MONOCYTES NFR BLD AUTO: 10.1 % (ref 2–12)
NEUTROPHILS # BLD AUTO: 6.5 /CMM (ref 1.8–8.9)
NEUTROPHILS NFR BLD AUTO: 75.3 % (ref 43–81)
PLATELET # BLD AUTO: 294 /CMM (ref 150–450)
POTASSIUM SERPL-SCNC: 4.7 MMOL/L (ref 3.5–5.1)
RBC # BLD AUTO: 4.19 MIL/UL (ref 4–5.2)
SODIUM SERPL-SCNC: 138 MMOL/L (ref 136–145)
WBC NRBC COR # BLD AUTO: 8.6 K/UL (ref 4.3–11)

## 2021-02-11 RX ADMIN — AMLODIPINE BESYLATE SCH MG: 10 TABLET ORAL at 08:44

## 2021-02-11 RX ADMIN — ENOXAPARIN SODIUM SCH MG: 40 INJECTION SUBCUTANEOUS at 20:17

## 2021-02-11 RX ADMIN — LOSARTAN POTASSIUM SCH MG: 50 TABLET, FILM COATED ORAL at 08:44

## 2021-02-11 RX ADMIN — Medication SCH ML: at 12:23

## 2021-02-11 RX ADMIN — DEXAMETHASONE SODIUM PHOSPHATE SCH MG: 10 INJECTION INTRAMUSCULAR; INTRAVENOUS at 08:37

## 2021-02-11 RX ADMIN — ENOXAPARIN SODIUM SCH MG: 40 INJECTION SUBCUTANEOUS at 08:36

## 2021-02-11 RX ADMIN — Medication SCH ML: at 08:37

## 2021-02-11 RX ADMIN — Medication SCH ML: at 17:12

## 2021-02-11 NOTE — NUR
RN NOTE



RECEIVED IN BED, ALERT AND ORIENTED X4. ABLE TO COMMUNICATE NEEDS. O2 SAT AT 90 % ON ROOM 
AIR, PT DENIES ANY SOB.  OFFERED OXYGEN, PT REFUSED. VERBALIZES SHE FEELS FINE. NO RESP 
DISTRESS NOTED. ON TELE MONITORING SHOWS SR WITH HR OF 66. RAC IV PATENT AND INTACT, 
FLUSHED. NO SIGNS OF INFECTION NOTED. ALL SAFETY MEASURES IMPLEMENTED PER PROTOCOL. BED 
LOCKED IN LOWEST POSITION. CALL LIGHT WITHIN REACH. SIDE RAILS UP. WILL CONTINUE TO MONITOR.

## 2021-02-11 NOTE — NUR
RN CLOSING NOTE



RECEIVED PATIENT IN BED RESTING ALERT ORIENTED X4 VERBALLY RESPONSIVE ABLE TO MAKE NEEDS 
KNOWN,ON MONITOR FOR COVID POSITIVE DROPLET/CONTACT ISOLATION. PATIENT TITRATED DOWN 
THROUGHOUT THE SHIFT. CURRENTLY PATIENT IS ON ROOM AIR O2: 94% PATIENT DESATURATES DOWN 
DURING EXERTION BUT REFUSES O2 THERAPY IN HOPES OF GOING HOME TOMORROW. AT REST, PATIENT IS 
STABLE IN 90S. TURNING COUGHING/REPOSITIONING, DECREASES DOWN TO 85% THEN BACK UP. IV SITE 
IS ON RIGHT AC INTACT PATENT, CONTINENT BOWEL/BLADDER CALL LIGHT WITHIN REACH,SAFETY MEASURE 
IMPLEMENT,BED IN LOW POSITION AND LOCKED. WILL ENDORSE TO NIGHT SHIFT NURSE FOR JOSEP.

## 2021-02-11 NOTE — NUR
RN OPENING NOTE



RECEIVED PATIENT IN BED RESTING ALERT ORIENTED X4 VERBALLY RESPONSIVE ABLE TO MAKE NEEDS 
KNOWN,ON MONITOR FOR COVID POSITIVE DROPLET/CONTACT ISOLATION,ON HIGH FLOW OXYGEN 30L FIO2: 
35% O2: 97% IV SITE IS ON RIGHT AC INTACT PATENT, CONTINENT BOWEL/BLADDER CALL LIGHT WITHIN 
REACH,SAFETY MEASURE IMPLEMENT,BED IN LOW POSITION AND LOCKED,CONTINUE TO MONITOR AND 
PROVIDE TREATMENT

## 2021-02-11 NOTE — NUR
PT ON BED ASLEEP EASY TO WAKE UP A/O X3 STILL ON HIGH FLOW 30L 35% FIO2 AND NON  SPO2 97% NO 
DISTRESS NOTED NO PAIN COMPLAINED NO SIGNIFICANT CHANGES ON CONDITION NOTED ALL NEEDS 
ATTENDED BED ON LOWEST POSITION AND LOCKED SIDE RAILS UP X2 CALL LIGHT WITHIN REACH WILL 
ENDORSED TO AM SHIFT NURSE

## 2021-02-12 VITALS — SYSTOLIC BLOOD PRESSURE: 141 MMHG | DIASTOLIC BLOOD PRESSURE: 71 MMHG

## 2021-02-12 VITALS — DIASTOLIC BLOOD PRESSURE: 57 MMHG | SYSTOLIC BLOOD PRESSURE: 125 MMHG

## 2021-02-12 VITALS — SYSTOLIC BLOOD PRESSURE: 106 MMHG | DIASTOLIC BLOOD PRESSURE: 64 MMHG

## 2021-02-12 VITALS — SYSTOLIC BLOOD PRESSURE: 111 MMHG | DIASTOLIC BLOOD PRESSURE: 56 MMHG

## 2021-02-12 LAB
BASOPHILS # BLD AUTO: 0 /CMM (ref 0–0.2)
BASOPHILS NFR BLD AUTO: 0.5 % (ref 0–2)
BUN SERPL-MCNC: 22 MG/DL (ref 7–18)
CALCIUM SERPL-MCNC: 9 MG/DL (ref 8.5–10.1)
CHLORIDE SERPL-SCNC: 104 MMOL/L (ref 98–107)
CO2 SERPL-SCNC: 27 MMOL/L (ref 21–32)
CREAT SERPL-MCNC: 1 MG/DL (ref 0.6–1.3)
EOSINOPHIL NFR BLD AUTO: 0.8 % (ref 0–6)
GLUCOSE SERPL-MCNC: 78 MG/DL (ref 74–106)
HCT VFR BLD AUTO: 40 % (ref 33–45)
HGB BLD-MCNC: 13.4 G/DL (ref 11.5–14.8)
LYMPHOCYTES NFR BLD AUTO: 1.6 /CMM (ref 0.8–4.8)
LYMPHOCYTES NFR BLD AUTO: 16.8 % (ref 20–44)
MCHC RBC AUTO-ENTMCNC: 33 G/DL (ref 31–36)
MCV RBC AUTO: 91 FL (ref 82–100)
MONOCYTES NFR BLD AUTO: 0.9 /CMM (ref 0.1–1.3)
MONOCYTES NFR BLD AUTO: 9.5 % (ref 2–12)
NEUTROPHILS # BLD AUTO: 6.8 /CMM (ref 1.8–8.9)
NEUTROPHILS NFR BLD AUTO: 72.4 % (ref 43–81)
PLATELET # BLD AUTO: 278 /CMM (ref 150–450)
POTASSIUM SERPL-SCNC: 4.6 MMOL/L (ref 3.5–5.1)
RBC # BLD AUTO: 4.43 MIL/UL (ref 4–5.2)
SODIUM SERPL-SCNC: 140 MMOL/L (ref 136–145)
WBC NRBC COR # BLD AUTO: 9.3 K/UL (ref 4.3–11)

## 2021-02-12 RX ADMIN — AMLODIPINE BESYLATE SCH MG: 10 TABLET ORAL at 09:04

## 2021-02-12 RX ADMIN — Medication SCH ML: at 08:00

## 2021-02-12 RX ADMIN — ENOXAPARIN SODIUM SCH MG: 40 INJECTION SUBCUTANEOUS at 09:07

## 2021-02-12 RX ADMIN — LOSARTAN POTASSIUM SCH MG: 50 TABLET, FILM COATED ORAL at 09:05

## 2021-02-12 RX ADMIN — Medication SCH ML: at 13:00

## 2021-02-12 RX ADMIN — DEXAMETHASONE SODIUM PHOSPHATE SCH MG: 10 INJECTION INTRAMUSCULAR; INTRAVENOUS at 09:05

## 2021-02-12 NOTE — NUR
RN CLOSING NOTE



PT REMAIN STABLE. TOLERATING ROOM AIR. O2 SAT AT 93 %. DENIES ANY SOB ALL SHIFT. NO RESP 
DISTRESS NOTED. TELE MONITOR SHOWS SINUS RHYTHM WITH HR OF 63. IV REMAIN PATENT AND INTACT, 
NO SIGNS OF INFECTION. PT ABLE TO MAKE NEEDS KNOWN, DENIES PAIN. ASSISTED ON NEEDS PT ABLE 
TO USE BEDSIDE COMMODE WITH STAND BY ASSIST. CALL LIGHT WITHIN REACH AT ALL TIMES. WILL 
ENDORSE TO NEXT SHIFT NURSE FOR JOSEP.

## 2021-02-12 NOTE — NUR
RN DISCHARGE NOTE

DISCHARGED PATIENT TO  VIA WHEELCHAIR. PATIENT IS STABLE ON ROOM AIR. ALL VENOUS 
LINES HAVE BEEN REMOVED. ALL BELONGINGS ARE WITH PATIENT. DISCHARGE INSTRUCTIONS ARE WITH 
PATIENT.

## 2021-02-12 NOTE — NUR
RN OPENING NOTE

PATIENT IS IN BED WITH HOB AT SEMI FOWLERS POSITION. PATIENT IS AOX4 ON ROOM AIR WITH NO 
SIGNS OF LABORED BREATHING. SKIN IS INTACT. RAC#18 IS PATENT, INTACT, AND HAS NO SIGNS OF 
INFILTRATION. BED IS LOCKED IN THE LOWEST POSITION, CALL BELL WITHIN REACH, 3 GUARD RAILS 
RAISED, AND ALL HOSPITAL SAFETY PRECAUTIONS ARE BEING FOLLOWED. WILL CONTINUE TO MONITOR 
THROUGHOUT SHIFT.